# Patient Record
Sex: MALE | Race: BLACK OR AFRICAN AMERICAN | NOT HISPANIC OR LATINO | URBAN - NONMETROPOLITAN AREA
[De-identification: names, ages, dates, MRNs, and addresses within clinical notes are randomized per-mention and may not be internally consistent; named-entity substitution may affect disease eponyms.]

---

## 2020-11-13 ENCOUNTER — IMPORTED ENCOUNTER (OUTPATIENT)
Dept: URBAN - NONMETROPOLITAN AREA CLINIC 1 | Facility: CLINIC | Age: 30
End: 2020-11-13

## 2020-11-13 PROBLEM — H52.01: Noted: 2020-11-13

## 2020-11-13 PROCEDURE — S0620 ROUTINE OPHTHALMOLOGICAL EXA: HCPCS

## 2020-11-13 NOTE — PATIENT DISCUSSION
Hyperopia ODDiscussed refractive status in detail with patient. No glasses needed at this time. Continue to monitor.

## 2022-04-15 ASSESSMENT — TONOMETRY
OD_IOP_MMHG: 18
OS_IOP_MMHG: 17

## 2022-04-15 ASSESSMENT — VISUAL ACUITY
OD_CC: 20/25
OS_CC: 20/20